# Patient Record
Sex: MALE | Race: WHITE | NOT HISPANIC OR LATINO | Employment: FULL TIME | URBAN - METROPOLITAN AREA
[De-identification: names, ages, dates, MRNs, and addresses within clinical notes are randomized per-mention and may not be internally consistent; named-entity substitution may affect disease eponyms.]

---

## 2017-01-10 ENCOUNTER — PATIENT MESSAGE (OUTPATIENT)
Dept: FAMILY MEDICINE | Facility: CLINIC | Age: 32
End: 2017-01-10

## 2017-03-29 ENCOUNTER — PATIENT MESSAGE (OUTPATIENT)
Dept: FAMILY MEDICINE | Facility: CLINIC | Age: 32
End: 2017-03-29

## 2017-06-28 ENCOUNTER — OFFICE VISIT (OUTPATIENT)
Dept: FAMILY MEDICINE | Facility: CLINIC | Age: 32
End: 2017-06-28
Payer: COMMERCIAL

## 2017-06-28 VITALS
WEIGHT: 195.44 LBS | TEMPERATURE: 98 F | OXYGEN SATURATION: 97 % | SYSTOLIC BLOOD PRESSURE: 110 MMHG | HEART RATE: 65 BPM | DIASTOLIC BLOOD PRESSURE: 80 MMHG | HEIGHT: 67 IN | BODY MASS INDEX: 30.67 KG/M2

## 2017-06-28 DIAGNOSIS — N45.3 EPIDIDYMO-ORCHITIS: Primary | ICD-10-CM

## 2017-06-28 DIAGNOSIS — K62.5 BRBPR (BRIGHT RED BLOOD PER RECTUM): ICD-10-CM

## 2017-06-28 DIAGNOSIS — R07.89 CHEST TIGHTNESS: ICD-10-CM

## 2017-06-28 PROCEDURE — 99999 PR PBB SHADOW E&M-EST. PATIENT-LVL III: CPT | Mod: PBBFAC,,, | Performed by: NURSE PRACTITIONER

## 2017-06-28 PROCEDURE — 99214 OFFICE O/P EST MOD 30 MIN: CPT | Mod: S$GLB,,, | Performed by: NURSE PRACTITIONER

## 2017-06-28 RX ORDER — DOXYCYCLINE 100 MG/1
100 CAPSULE ORAL 2 TIMES DAILY
Qty: 20 CAPSULE | Refills: 0 | Status: SHIPPED | OUTPATIENT
Start: 2017-06-28 | End: 2017-07-08

## 2017-06-28 RX ORDER — VILAZODONE HYDROCHLORIDE 20 MG/1
TABLET ORAL
COMMUNITY
Start: 2017-06-27

## 2017-06-28 RX ORDER — IBUPROFEN 800 MG/1
800 TABLET ORAL 3 TIMES DAILY PRN
Qty: 40 TABLET | Refills: 0 | Status: SHIPPED | OUTPATIENT
Start: 2017-06-28 | End: 2017-07-08

## 2017-06-28 RX ORDER — ALBUTEROL SULFATE 90 UG/1
2 AEROSOL, METERED RESPIRATORY (INHALATION) EVERY 6 HOURS PRN
Qty: 1 EACH | Refills: 11 | Status: SHIPPED | OUTPATIENT
Start: 2017-06-28 | End: 2023-12-28

## 2017-06-28 NOTE — PROGRESS NOTES
Subjective:       Patient ID: Johan Cunha is a 31 y.o. male.    Chief Complaint: Rectal Bleeding (off and on x 2 weeks) and pain in penis (x 1 1/2 weeks)    HPI     Patient presents to clinic to clinic with complaints of rectal bleeding, noted a scant amount on toilet paper after wiping after defecation. Sx seemed to be intermitted. Denies hx of constipation. Has been having occasional episodes of diarrhea - Relates to viibryd. Denies hematochezia, hematemesis.   Patient presents to clinic with complaints of pain to the left aspect of the base of the penis radiating to the left testicle. Questions whether there has been swelling. Pain is always present, worse with prolonged sitting. Noted significant pain after intercourse with wife (monogamous relationship) last week. Denies urinary sx, penile discharge.   Lastly, he has noted some chest tightness in recent weeks. Feels as though he can't take a deep breath. Denies VARGAS. Notes some mid-sternal tenderness upon palpation, otherwise denies CP.     Review of Systems   Constitutional: Negative for chills and fever.   Respiratory: Positive for chest tightness.    Gastrointestinal: Positive for anal bleeding and nausea. Negative for diarrhea and vomiting.   Genitourinary: Positive for penile pain, penile swelling, scrotal swelling and testicular pain. Negative for decreased urine volume, difficulty urinating, discharge, dysuria and frequency.       Objective:      Physical Exam   Constitutional: He is oriented to person, place, and time. He appears well-developed and well-nourished.   HENT:   Head: Normocephalic and atraumatic.   Cardiovascular: Normal rate, regular rhythm and normal heart sounds.    No murmur heard.  Pulmonary/Chest: Effort normal and breath sounds normal. No respiratory distress. He has no wheezes. He has no rales.   Genitourinary: Right testis shows no mass, no swelling and no tenderness. Right testis is descended. Left testis shows tenderness. Left  testis shows no mass and no swelling. Left testis is descended.   Genitourinary Comments: Declined PALLAVI.    Musculoskeletal: Normal range of motion. He exhibits no edema, tenderness or deformity.   Neurological: He is alert and oriented to person, place, and time. No cranial nerve deficit.   Skin: Skin is warm and dry.   Psychiatric: He has a normal mood and affect. His behavior is normal.   Nursing note and vitals reviewed.      Assessment:       1. Epididymo-orchitis    2. Chest tightness    3. BRBPR (bright red blood per rectum)        Plan:   Johan was seen today for rectal bleeding and pain in penis.    Diagnoses and all orders for this visit:    Epididymo-orchitis  -     doxycycline (VIBRAMYCIN) 100 MG Cap; Take 1 capsule (100 mg total) by mouth 2 (two) times daily.  -     US Scrotum And Testicles; Future  -     ibuprofen (ADVIL,MOTRIN) 800 MG tablet; Take 1 tablet (800 mg total) by mouth 3 (three) times daily as needed for Pain.  Side effects and precautions of medication use reviewed with patient, expressed understanding. No questions or concerns.    Chest tightness  -     X-Ray Chest PA And Lateral; Future  -     EKG 12-lead; Future  -     albuterol 90 mcg/actuation inhaler; Inhale 2 puffs into the lungs every 6 (six) hours as needed for Wheezing.  Red flags reviewed. Go to the ER with active complaints.     BRBPR (bright red blood per rectum)  Patient declined in office exam/additional evaluation.   May be related to excoriation secondary to diarrhea.   Sx have resolved.   Discussed risks associated with rectal bleeding, including cancer.   Pt to follow-up if sx return for additional evaluation.

## 2017-07-06 ENCOUNTER — CLINICAL SUPPORT (OUTPATIENT)
Dept: CARDIOLOGY | Facility: CLINIC | Age: 32
End: 2017-07-06
Payer: COMMERCIAL

## 2017-07-06 ENCOUNTER — PATIENT MESSAGE (OUTPATIENT)
Dept: FAMILY MEDICINE | Facility: CLINIC | Age: 32
End: 2017-07-06

## 2017-07-06 ENCOUNTER — HOSPITAL ENCOUNTER (OUTPATIENT)
Dept: RADIOLOGY | Facility: HOSPITAL | Age: 32
Discharge: HOME | End: 2017-07-06
Attending: NURSE PRACTITIONER
Payer: COMMERCIAL

## 2017-07-06 DIAGNOSIS — R07.89 CHEST TIGHTNESS: ICD-10-CM

## 2017-07-06 DIAGNOSIS — N45.3 EPIDIDYMO-ORCHITIS: ICD-10-CM

## 2017-07-06 PROCEDURE — 71020 XR CHEST PA AND LATERAL: CPT | Mod: TC,PO

## 2017-07-06 PROCEDURE — 71020 XR CHEST PA AND LATERAL: CPT | Mod: 26,,, | Performed by: RADIOLOGY

## 2017-07-06 PROCEDURE — 93000 ELECTROCARDIOGRAM COMPLETE: CPT | Mod: S$GLB,,, | Performed by: INTERNAL MEDICINE

## 2017-07-06 PROCEDURE — 76870 US EXAM SCROTUM: CPT | Mod: 26,,, | Performed by: RADIOLOGY

## 2017-07-06 PROCEDURE — 76870 US EXAM SCROTUM: CPT | Mod: TC,PO

## 2019-08-15 LAB — RAPID GROUP A STREP (OHS): POSITIVE

## 2019-09-04 ENCOUNTER — TELEPHONE (OUTPATIENT)
Dept: FAMILY MEDICINE | Facility: CLINIC | Age: 34
End: 2019-09-04

## 2019-11-27 LAB
INFLUENZA A ANTIGEN, POC: NEGATIVE
INFLUENZA B ANTIGEN, POC: POSITIVE
RAPID GROUP A STREP (OHS): NEGATIVE

## 2021-09-01 ENCOUNTER — HISTORICAL (OUTPATIENT)
Dept: ADMINISTRATIVE | Facility: HOSPITAL | Age: 36
End: 2021-09-01

## 2021-09-01 LAB — SARS-COV-2 RNA RESP QL NAA+PROBE: POSITIVE

## 2022-04-10 ENCOUNTER — HISTORICAL (OUTPATIENT)
Dept: ADMINISTRATIVE | Facility: HOSPITAL | Age: 37
End: 2022-04-10
Payer: COMMERCIAL

## 2022-04-27 VITALS
HEIGHT: 67 IN | BODY MASS INDEX: 30.45 KG/M2 | OXYGEN SATURATION: 98 % | DIASTOLIC BLOOD PRESSURE: 89 MMHG | WEIGHT: 194 LBS | SYSTOLIC BLOOD PRESSURE: 134 MMHG

## 2022-09-16 ENCOUNTER — HISTORICAL (OUTPATIENT)
Dept: ADMINISTRATIVE | Facility: HOSPITAL | Age: 37
End: 2022-09-16
Payer: COMMERCIAL

## 2022-09-17 ENCOUNTER — HISTORICAL (OUTPATIENT)
Dept: ADMINISTRATIVE | Facility: HOSPITAL | Age: 37
End: 2022-09-17
Payer: COMMERCIAL

## 2023-03-04 ENCOUNTER — OFFICE VISIT (OUTPATIENT)
Dept: URGENT CARE | Facility: CLINIC | Age: 38
End: 2023-03-04
Payer: COMMERCIAL

## 2023-03-04 VITALS
OXYGEN SATURATION: 98 % | WEIGHT: 204 LBS | DIASTOLIC BLOOD PRESSURE: 85 MMHG | TEMPERATURE: 98 F | SYSTOLIC BLOOD PRESSURE: 128 MMHG | HEART RATE: 96 BPM | HEIGHT: 66 IN | BODY MASS INDEX: 32.78 KG/M2 | RESPIRATION RATE: 16 BRPM

## 2023-03-04 DIAGNOSIS — J00 ACUTE NASOPHARYNGITIS: Primary | ICD-10-CM

## 2023-03-04 DIAGNOSIS — J02.9 SORE THROAT: ICD-10-CM

## 2023-03-04 LAB
CTP QC/QA: YES
MOLECULAR STREP A: NEGATIVE

## 2023-03-04 PROCEDURE — 87651 STREP A DNA AMP PROBE: CPT | Mod: QW,,, | Performed by: PHYSICIAN ASSISTANT

## 2023-03-04 PROCEDURE — 87651 POCT STREP A MOLECULAR: ICD-10-PCS | Mod: QW,,, | Performed by: PHYSICIAN ASSISTANT

## 2023-03-04 PROCEDURE — 3074F PR MOST RECENT SYSTOLIC BLOOD PRESSURE < 130 MM HG: ICD-10-PCS | Mod: CPTII,,, | Performed by: PHYSICIAN ASSISTANT

## 2023-03-04 PROCEDURE — 3079F DIAST BP 80-89 MM HG: CPT | Mod: CPTII,,, | Performed by: PHYSICIAN ASSISTANT

## 2023-03-04 PROCEDURE — 3079F PR MOST RECENT DIASTOLIC BLOOD PRESSURE 80-89 MM HG: ICD-10-PCS | Mod: CPTII,,, | Performed by: PHYSICIAN ASSISTANT

## 2023-03-04 PROCEDURE — 96372 PR INJECTION,THERAP/PROPH/DIAG2ST, IM OR SUBCUT: ICD-10-PCS | Mod: JZ,,, | Performed by: PHYSICIAN ASSISTANT

## 2023-03-04 PROCEDURE — 99213 OFFICE O/P EST LOW 20 MIN: CPT | Mod: 25,,, | Performed by: PHYSICIAN ASSISTANT

## 2023-03-04 PROCEDURE — 3008F BODY MASS INDEX DOCD: CPT | Mod: CPTII,,, | Performed by: PHYSICIAN ASSISTANT

## 2023-03-04 PROCEDURE — 96372 THER/PROPH/DIAG INJ SC/IM: CPT | Mod: JZ,,, | Performed by: PHYSICIAN ASSISTANT

## 2023-03-04 PROCEDURE — 3008F PR BODY MASS INDEX (BMI) DOCUMENTED: ICD-10-PCS | Mod: CPTII,,, | Performed by: PHYSICIAN ASSISTANT

## 2023-03-04 PROCEDURE — 1159F PR MEDICATION LIST DOCUMENTED IN MEDICAL RECORD: ICD-10-PCS | Mod: CPTII,,, | Performed by: PHYSICIAN ASSISTANT

## 2023-03-04 PROCEDURE — 99213 PR OFFICE/OUTPT VISIT, EST, LEVL III, 20-29 MIN: ICD-10-PCS | Mod: 25,,, | Performed by: PHYSICIAN ASSISTANT

## 2023-03-04 PROCEDURE — 1160F RVW MEDS BY RX/DR IN RCRD: CPT | Mod: CPTII,,, | Performed by: PHYSICIAN ASSISTANT

## 2023-03-04 PROCEDURE — 1160F PR REVIEW ALL MEDS BY PRESCRIBER/CLIN PHARMACIST DOCUMENTED: ICD-10-PCS | Mod: CPTII,,, | Performed by: PHYSICIAN ASSISTANT

## 2023-03-04 PROCEDURE — 1159F MED LIST DOCD IN RCRD: CPT | Mod: CPTII,,, | Performed by: PHYSICIAN ASSISTANT

## 2023-03-04 PROCEDURE — 3074F SYST BP LT 130 MM HG: CPT | Mod: CPTII,,, | Performed by: PHYSICIAN ASSISTANT

## 2023-03-04 RX ORDER — ROSUVASTATIN CALCIUM 20 MG/1
1 TABLET, COATED ORAL DAILY
COMMUNITY
Start: 2023-01-16

## 2023-03-04 RX ORDER — DULOXETIN HYDROCHLORIDE 30 MG/1
1 CAPSULE, DELAYED RELEASE ORAL EVERY MORNING
COMMUNITY
Start: 2022-12-09 | End: 2023-12-28

## 2023-03-04 RX ORDER — BETAMETHASONE SODIUM PHOSPHATE AND BETAMETHASONE ACETATE 3; 3 MG/ML; MG/ML
9 INJECTION, SUSPENSION INTRA-ARTICULAR; INTRALESIONAL; INTRAMUSCULAR; SOFT TISSUE
Status: COMPLETED | OUTPATIENT
Start: 2023-03-04 | End: 2023-03-04

## 2023-03-04 RX ADMIN — BETAMETHASONE SODIUM PHOSPHATE AND BETAMETHASONE ACETATE 9 MG: 3; 3 INJECTION, SUSPENSION INTRA-ARTICULAR; INTRALESIONAL; INTRAMUSCULAR; SOFT TISSUE at 02:03

## 2023-03-04 NOTE — PROGRESS NOTES
"Subjective:       Patient ID: Johan Cunha is a 37 y.o. male.    Vitals:  height is 5' 6" (1.676 m) and weight is 92.5 kg (204 lb). His temperature is 98 °F (36.7 °C). His blood pressure is 128/85 and his pulse is 96. His respiration is 16 and oxygen saturation is 98%.     Chief Complaint: Sore Throat (Scratchy throat)    Sore throat, congestion, nasal drip x2 days. Sudafed taken at 8:30am, minor relief.  Denies any fever cough neck stiffness rash or GI symptoms.    Sore Throat     HENT:  Positive for sore throat.      Objective:      Physical Exam   Constitutional: He is oriented to person, place, and time. He appears well-developed. He is cooperative.  Non-toxic appearance. He does not appear ill. No distress.   HENT:   Head: Normocephalic and atraumatic.   Ears:   Right Ear: Hearing, tympanic membrane, external ear and ear canal normal.   Left Ear: Hearing, tympanic membrane, external ear and ear canal normal.   Nose: Nose normal. No nasal deformity. No epistaxis.   Mouth/Throat: Uvula is midline and mucous membranes are normal. No trismus in the jaw. Normal dentition. No uvula swelling. Posterior oropharyngeal erythema present. No oropharyngeal exudate or posterior oropharyngeal edema.   Eyes: Conjunctivae and lids are normal. No scleral icterus.   Neck: Trachea normal and phonation normal. Neck supple. No edema present. No erythema present. No neck rigidity present.   Cardiovascular: Normal rate, regular rhythm, normal heart sounds and normal pulses.   Pulmonary/Chest: Effort normal and breath sounds normal. No respiratory distress. He has no decreased breath sounds. He has no rhonchi.   Abdominal: Normal appearance.   Musculoskeletal: Normal range of motion.         General: No deformity. Normal range of motion.   Lymphadenopathy:     He has no cervical adenopathy.   Neurological: He is alert and oriented to person, place, and time. He exhibits normal muscle tone. Coordination normal.   Skin: Skin is warm, " "dry, intact, not diaphoretic and not pale.   Psychiatric: His speech is normal and behavior is normal. Judgment and thought content normal.   Nursing note and vitals reviewed.             Previous History      Review of patient's allergies indicates:  No Known Allergies    Past Medical History:   Diagnosis Date    Anxiety 6/19/2013    Panic disorder    HLD (hyperlipidemia) 3/2/2015     Current Outpatient Medications   Medication Instructions    albuterol 90 mcg/actuation inhaler 2 puffs, Inhalation, Every 6 hours PRN    DULoxetine (CYMBALTA) 30 MG capsule 1 capsule, Oral, Every morning    rosuvastatin (CRESTOR) 20 MG tablet 1 tablet, Oral, Daily    VIIBRYD 20 mg Tab No dose, route, or frequency recorded.     Past Surgical History:   Procedure Laterality Date    TONSILLECTOMY AND ADENOIDECTOMY       Family History   Problem Relation Age of Onset    Depression Mother     Hyperlipidemia Father     Heart disease Father 55        MI    Depression Father     No Known Problems Sister     No Known Problems Brother     Depression Maternal Grandmother         possible bipolar    Heart disease Paternal Grandmother 54        MI    Heart disease Paternal Grandfather         cabg       Social History     Tobacco Use    Smoking status: Never     Passive exposure: Never    Smokeless tobacco: Never   Substance Use Topics    Alcohol use: Yes     Alcohol/week: 12.0 standard drinks     Types: 12 Cans of beer per week        Physical Exam      Vital Signs Reviewed   /85   Pulse 96   Temp 98 °F (36.7 °C)   Resp 16   Ht 5' 6" (1.676 m)   Wt 92.5 kg (204 lb)   SpO2 98%   BMI 32.93 kg/m²        Procedures    Procedures     Labs     Results for orders placed or performed in visit on 03/04/23   POCT Strep A, Molecular   Result Value Ref Range    Molecular Strep A, POC Negative Negative     Acceptable Yes      Assessment:       1. Acute nasopharyngitis    2. Sore throat          Plan:         Acute " nasopharyngitis    Sore throat  -     POCT Strep A, Molecular    Other orders  -     betamethasone acetate-betamethasone sodium phosphate injection 9 mg    Drink plenty of fluids.     Get plenty of rest.     Tylenol or Motrin as needed.     Go to the ER with any significant change or worsening of symptoms.     Follow up with your primary care doctor.

## 2023-08-19 ENCOUNTER — OFFICE VISIT (OUTPATIENT)
Dept: URGENT CARE | Facility: CLINIC | Age: 38
End: 2023-08-19
Payer: COMMERCIAL

## 2023-08-19 VITALS
HEIGHT: 66 IN | SYSTOLIC BLOOD PRESSURE: 138 MMHG | RESPIRATION RATE: 20 BRPM | DIASTOLIC BLOOD PRESSURE: 83 MMHG | BODY MASS INDEX: 32.49 KG/M2 | WEIGHT: 202.19 LBS | TEMPERATURE: 99 F | OXYGEN SATURATION: 97 % | HEART RATE: 87 BPM

## 2023-08-19 DIAGNOSIS — M25.561 PAIN AND SWELLING OF RIGHT KNEE: Primary | ICD-10-CM

## 2023-08-19 DIAGNOSIS — M25.461 PAIN AND SWELLING OF RIGHT KNEE: Primary | ICD-10-CM

## 2023-08-19 PROCEDURE — 99213 OFFICE O/P EST LOW 20 MIN: CPT | Mod: ,,, | Performed by: PHYSICIAN ASSISTANT

## 2023-08-19 PROCEDURE — 99213 PR OFFICE/OUTPT VISIT, EST, LEVL III, 20-29 MIN: ICD-10-PCS | Mod: ,,, | Performed by: PHYSICIAN ASSISTANT

## 2023-08-19 RX ORDER — NABUMETONE 500 MG/1
500 TABLET, FILM COATED ORAL 2 TIMES DAILY PRN
Qty: 20 TABLET | Refills: 0 | Status: SHIPPED | OUTPATIENT
Start: 2023-08-19 | End: 2023-08-19

## 2023-08-19 RX ORDER — TRAMADOL HYDROCHLORIDE 50 MG/1
50 TABLET ORAL EVERY 6 HOURS PRN
Qty: 12 TABLET | Refills: 0 | Status: SHIPPED | OUTPATIENT
Start: 2023-08-19 | End: 2023-08-22

## 2023-08-19 RX ORDER — PREDNISONE 20 MG/1
20 TABLET ORAL 2 TIMES DAILY
Qty: 10 TABLET | Refills: 0 | Status: SHIPPED | OUTPATIENT
Start: 2023-08-19 | End: 2023-08-19

## 2023-08-19 RX ORDER — NABUMETONE 500 MG/1
500 TABLET, FILM COATED ORAL 2 TIMES DAILY PRN
Qty: 20 TABLET | Refills: 0 | Status: SHIPPED | OUTPATIENT
Start: 2023-08-19 | End: 2023-08-29

## 2023-08-19 RX ORDER — PREDNISONE 20 MG/1
20 TABLET ORAL 2 TIMES DAILY
Qty: 10 TABLET | Refills: 0 | Status: SHIPPED | OUTPATIENT
Start: 2023-08-19 | End: 2023-08-24

## 2023-08-19 RX ORDER — CLOMIPHENE CITRATE 50 MG/1
50 TABLET ORAL DAILY
COMMUNITY

## 2023-08-19 RX ORDER — TRAMADOL HYDROCHLORIDE 50 MG/1
50 TABLET ORAL EVERY 6 HOURS PRN
Qty: 12 TABLET | Refills: 0 | Status: SHIPPED | OUTPATIENT
Start: 2023-08-19 | End: 2023-08-19

## 2023-08-19 NOTE — PROGRESS NOTES
"Subjective:      Patient ID: Johan Cunha is a 37 y.o. male.    Vitals:  height is 5' 6" (1.676 m) and weight is 91.7 kg (202 lb 3.2 oz). His oral temperature is 99.4 °F (37.4 °C). His blood pressure is 138/83 and his pulse is 87. His respiration is 20 and oxygen saturation is 97%.     Chief Complaint: Knee Pain    Patient is a 37-year-old male complains of a few day history of right knee pain and swelling.  He denies any trauma, but he went to the gym and did calf workout which may have aggravated the knee.  Denies any erythema or fever.  Complains of a significant increase in pain with knee range of motion.        Skin:  Negative for erythema.      Objective:     Physical Exam   Constitutional: He is oriented to person, place, and time. He appears well-developed.   HENT:   Head: Normocephalic and atraumatic. Head is without abrasion, without contusion and without laceration.   Ears:   Right Ear: External ear normal.   Left Ear: External ear normal.   Nose: Nose normal.   Mouth/Throat: Oropharynx is clear and moist and mucous membranes are normal.   Eyes: Conjunctivae, EOM and lids are normal.   Neck: Phonation normal. Neck supple.   Pulmonary/Chest: Effort normal. No respiratory distress.   Abdominal: Normal appearance.   Musculoskeletal:         General: Swelling and tenderness present.   Neurological: He is alert and oriented to person, place, and time.   Skin: Skin is warm, dry, intact and no rash. Capillary refill takes less than 2 seconds. No abrasion, No burn, No bruising, No erythema and No ecchymosis   Psychiatric: His speech is normal and behavior is normal. Judgment and thought content normal.   Nursing note and vitals reviewed.  Right lower extremity:  There is swelling and diffuse tenderness throughout the anterior, medial, and lateral aspect of the knee.  Range of motion limited secondary to pain.  No appreciated erythema or warmth.  No appreciated calf swelling or erythema.  I was unable to assess " "for ligamentous laxity secondary to the patient regarding.    Xrays:  No observed acute findings. Awaiting radiology over read.      Previous History      Review of patient's allergies indicates:  No Known Allergies    Past Medical History:   Diagnosis Date    Anxiety 6/19/2013    Panic disorder    HLD (hyperlipidemia) 3/2/2015     Current Outpatient Medications   Medication Instructions    albuterol 90 mcg/actuation inhaler 2 puffs, Inhalation, Every 6 hours PRN    clomiPHENE (CLOMID) 50 mg, Oral, Daily    DULoxetine (CYMBALTA) 30 MG capsule 1 capsule, Oral, Every morning    nabumetone (RELAFEN) 500 mg, Oral, 2 times daily PRN    predniSONE (DELTASONE) 20 mg, Oral, 2 times daily    rosuvastatin (CRESTOR) 20 MG tablet 1 tablet, Oral, Daily    traMADoL (ULTRAM) 50 mg, Oral, Every 6 hours PRN    VIIBRYD 20 mg Tab No dose, route, or frequency recorded.     Past Surgical History:   Procedure Laterality Date    TONSILLECTOMY AND ADENOIDECTOMY       Family History   Problem Relation Age of Onset    Depression Mother     Hyperlipidemia Father     Heart disease Father 55        MI    Depression Father     No Known Problems Sister     No Known Problems Brother     Depression Maternal Grandmother         possible bipolar    Heart disease Paternal Grandmother 54        MI    Heart disease Paternal Grandfather         cabg       Social History     Tobacco Use    Smoking status: Never     Passive exposure: Never    Smokeless tobacco: Never   Substance Use Topics    Alcohol use: Yes     Alcohol/week: 12.0 standard drinks of alcohol     Types: 12 Cans of beer per week        Physical Exam      Vital Signs Reviewed   /83 (BP Location: Left arm, Patient Position: Sitting, BP Method: Medium (Automatic))   Pulse 87   Temp 99.4 °F (37.4 °C) (Oral)   Resp 20   Ht 5' 6" (1.676 m)   Wt 91.7 kg (202 lb 3.2 oz)   SpO2 97%   BMI 32.64 kg/m²        Procedures    Procedures     Labs     Results for orders placed or performed in " visit on 03/04/23   POCT Strep A, Molecular   Result Value Ref Range    Molecular Strep A, POC Negative Negative     Acceptable Yes        Assessment:     1. Pain and swelling of right knee        Plan:       Pain and swelling of right knee  -     XR KNEE 3 VIEW RIGHT; Future; Expected date: 08/19/2023    Other orders  -     nabumetone (RELAFEN) 500 MG tablet; Take 1 tablet (500 mg total) by mouth 2 (two) times daily as needed for Pain.  Dispense: 20 tablet; Refill: 0  -     predniSONE (DELTASONE) 20 MG tablet; Take 1 tablet (20 mg total) by mouth 2 (two) times daily. for 5 days  Dispense: 10 tablet; Refill: 0  -     traMADoL (ULTRAM) 50 mg tablet; Take 1 tablet (50 mg total) by mouth every 6 (six) hours as needed for Pain.  Dispense: 12 tablet; Refill: 0    Take medications only as prescribed as they may cause sedation (safety precautions given).     Ace wrap or knee sleeve for compression    Loosen the splint if needed. Elevate extremity above the level of the heart while resting to avoid excessive swelling.     Get plenty of rest.    Follow-up with Orthopedics if needed.     Go to the Emergency Department with any significant change or worsening symptoms.

## 2023-08-19 NOTE — PATIENT INSTRUCTIONS
Take medications only as prescribed as they may cause sedation (safety precautions given).     Ace wrap or knee sleeve for compression    Loosen the splint if needed. Elevate extremity above the level of the heart while resting to avoid excessive swelling.     Get plenty of rest.    Follow-up with Orthopedics if needed.     Go to the Emergency Department with any significant change or worsening symptoms.

## 2023-08-28 ENCOUNTER — OFFICE VISIT (OUTPATIENT)
Dept: URGENT CARE | Facility: CLINIC | Age: 38
End: 2023-08-28
Payer: COMMERCIAL

## 2023-08-28 VITALS
TEMPERATURE: 99 F | OXYGEN SATURATION: 97 % | WEIGHT: 202 LBS | DIASTOLIC BLOOD PRESSURE: 88 MMHG | SYSTOLIC BLOOD PRESSURE: 136 MMHG | HEART RATE: 83 BPM | BODY MASS INDEX: 32.47 KG/M2 | HEIGHT: 66 IN

## 2023-08-28 DIAGNOSIS — M25.561 PAIN AND SWELLING OF RIGHT KNEE: Primary | ICD-10-CM

## 2023-08-28 DIAGNOSIS — M25.461 PAIN AND SWELLING OF RIGHT KNEE: Primary | ICD-10-CM

## 2023-08-28 PROCEDURE — 99213 PR OFFICE/OUTPT VISIT, EST, LEVL III, 20-29 MIN: ICD-10-PCS | Mod: 25,,, | Performed by: PHYSICIAN ASSISTANT

## 2023-08-28 PROCEDURE — 99213 OFFICE O/P EST LOW 20 MIN: CPT | Mod: 25,,, | Performed by: PHYSICIAN ASSISTANT

## 2023-08-28 PROCEDURE — 96372 THER/PROPH/DIAG INJ SC/IM: CPT | Mod: ,,, | Performed by: PHYSICIAN ASSISTANT

## 2023-08-28 PROCEDURE — 96372 PR INJECTION,THERAP/PROPH/DIAG2ST, IM OR SUBCUT: ICD-10-PCS | Mod: ,,, | Performed by: PHYSICIAN ASSISTANT

## 2023-08-28 RX ORDER — NABUMETONE 500 MG/1
500 TABLET, FILM COATED ORAL 2 TIMES DAILY PRN
Qty: 20 TABLET | Refills: 0 | Status: SHIPPED | OUTPATIENT
Start: 2023-08-28 | End: 2023-09-07

## 2023-08-28 RX ORDER — KETOROLAC TROMETHAMINE 30 MG/ML
60 INJECTION, SOLUTION INTRAMUSCULAR; INTRAVENOUS
Status: COMPLETED | OUTPATIENT
Start: 2023-08-28 | End: 2023-08-28

## 2023-08-28 RX ADMIN — KETOROLAC TROMETHAMINE 60 MG: 30 INJECTION, SOLUTION INTRAMUSCULAR; INTRAVENOUS at 08:08

## 2023-08-28 NOTE — PATIENT INSTRUCTIONS
Do not take nabumetone until this evening.     Knee brace    Loosen the splint if needed. Elevate extremity above the level of the heart while resting to avoid excessive swelling.     Get plenty of rest.    Follow-up with Orthopedics as directed.    Go to the Emergency Department with any significant change or worsening symptoms.

## 2023-08-28 NOTE — PROGRESS NOTES
"Subjective:      Patient ID: Johan Cunha is a 37 y.o. male.    Vitals:  height is 5' 6" (1.676 m) and weight is 91.6 kg (202 lb). His temperature is 98.9 °F (37.2 °C). His blood pressure is 136/88 and his pulse is 83. His oxygen saturation is 97%.     Chief Complaint: Pain     Patient is a 37 y.o. male who presents to urgent care with complaints of right knee pain w/ swelling. Patient was seen on 8/19 for knee pain. x3 weeks.  Some mild improvement with medication but still complains of pain and swelling.  Denies any acute trauma.  Denies any calf pain swelling or erythema.    Pain      Skin:  Negative for erythema.      Objective:     Physical Exam   Constitutional: He is oriented to person, place, and time. He appears well-developed.   HENT:   Head: Normocephalic and atraumatic. Head is without abrasion, without contusion and without laceration.   Ears:   Right Ear: External ear normal.   Left Ear: External ear normal.   Nose: Nose normal.   Mouth/Throat: Oropharynx is clear and moist and mucous membranes are normal.   Eyes: Conjunctivae, EOM and lids are normal.   Neck: Phonation normal. Neck supple.   Pulmonary/Chest: Effort normal. No respiratory distress.   Abdominal: Normal appearance.   Musculoskeletal:         General: Swelling present.   Neurological: He is alert and oriented to person, place, and time.   Skin: Skin is warm, dry, intact and no rash. Capillary refill takes less than 2 seconds. No abrasion, No burn, No bruising, No erythema and No ecchymosis   Psychiatric: His speech is normal and behavior is normal. Judgment and thought content normal.   Nursing note and vitals reviewed.  Right knee:  There is mild-to-moderate generalized swelling to anterior aspect of the knee.  No erythema or warmth.  Range of motion has improved since last visit.  No appreciated medial and lateral ligamentous laxity.  Negative Lachman's.  No appreciated calf swelling erythema or tenderness.         Previous History  " "    Review of patient's allergies indicates:  No Known Allergies    Past Medical History:   Diagnosis Date    Anxiety 6/19/2013    Panic disorder    HLD (hyperlipidemia) 3/2/2015     Current Outpatient Medications   Medication Instructions    albuterol 90 mcg/actuation inhaler 2 puffs, Inhalation, Every 6 hours PRN    clomiPHENE (CLOMID) 50 mg, Oral, Daily    DULoxetine (CYMBALTA) 30 MG capsule 1 capsule, Oral, Every morning    nabumetone (RELAFEN) 500 mg, Oral, 2 times daily PRN    nabumetone (RELAFEN) 500 mg, Oral, 2 times daily PRN    rosuvastatin (CRESTOR) 20 MG tablet 1 tablet, Oral, Daily    VIIBRYD 20 mg Tab No dose, route, or frequency recorded.     Past Surgical History:   Procedure Laterality Date    TONSILLECTOMY AND ADENOIDECTOMY       Family History   Problem Relation Age of Onset    Depression Mother     Hyperlipidemia Father     Heart disease Father 55        MI    Depression Father     No Known Problems Sister     No Known Problems Brother     Depression Maternal Grandmother         possible bipolar    Heart disease Paternal Grandmother 54        MI    Heart disease Paternal Grandfather         cabg       Social History     Tobacco Use    Smoking status: Never     Passive exposure: Never    Smokeless tobacco: Never   Substance Use Topics    Alcohol use: Yes     Alcohol/week: 12.0 standard drinks of alcohol     Types: 12 Cans of beer per week        Physical Exam      Vital Signs Reviewed   /88   Pulse 83   Temp 98.9 °F (37.2 °C)   Ht 5' 6" (1.676 m)   Wt 91.6 kg (202 lb)   SpO2 97%   BMI 32.60 kg/m²        Procedures    Procedures     Labs     Results for orders placed or performed in visit on 03/04/23   POCT Strep A, Molecular   Result Value Ref Range    Molecular Strep A, POC Negative Negative     Acceptable Yes        Assessment:     1. Pain and swelling of right knee        Plan:       Pain and swelling of right knee  -     Ambulatory referral/consult to " Orthopedics    Other orders  -     ketorolac injection 60 mg  -     nabumetone (RELAFEN) 500 MG tablet; Take 1 tablet (500 mg total) by mouth 2 (two) times daily as needed for Pain.  Dispense: 20 tablet; Refill: 0      Do not take nabumetone until this evening.     Knee brace    Loosen the splint if needed. Elevate extremity above the level of the heart while resting to avoid excessive swelling.     Get plenty of rest.    Follow-up with Orthopedics as directed.    Go to the Emergency Department with any significant change or worsening symptoms.

## 2023-09-06 ENCOUNTER — OFFICE VISIT (OUTPATIENT)
Dept: ORTHOPEDICS | Facility: CLINIC | Age: 38
End: 2023-09-06
Payer: COMMERCIAL

## 2023-09-06 VITALS
HEIGHT: 66 IN | BODY MASS INDEX: 32.47 KG/M2 | DIASTOLIC BLOOD PRESSURE: 90 MMHG | HEART RATE: 69 BPM | WEIGHT: 202 LBS | SYSTOLIC BLOOD PRESSURE: 133 MMHG

## 2023-09-06 DIAGNOSIS — M25.561 ACUTE PAIN OF RIGHT KNEE: Primary | ICD-10-CM

## 2023-09-06 PROCEDURE — 3075F SYST BP GE 130 - 139MM HG: CPT | Mod: CPTII,,, | Performed by: REHABILITATION UNIT

## 2023-09-06 PROCEDURE — 3080F PR MOST RECENT DIASTOLIC BLOOD PRESSURE >= 90 MM HG: ICD-10-PCS | Mod: CPTII,,, | Performed by: REHABILITATION UNIT

## 2023-09-06 PROCEDURE — 1159F PR MEDICATION LIST DOCUMENTED IN MEDICAL RECORD: ICD-10-PCS | Mod: CPTII,,, | Performed by: REHABILITATION UNIT

## 2023-09-06 PROCEDURE — 1159F MED LIST DOCD IN RCRD: CPT | Mod: CPTII,,, | Performed by: REHABILITATION UNIT

## 2023-09-06 PROCEDURE — 99203 OFFICE O/P NEW LOW 30 MIN: CPT | Mod: ,,, | Performed by: REHABILITATION UNIT

## 2023-09-06 PROCEDURE — 3075F PR MOST RECENT SYSTOLIC BLOOD PRESS GE 130-139MM HG: ICD-10-PCS | Mod: CPTII,,, | Performed by: REHABILITATION UNIT

## 2023-09-06 PROCEDURE — 3008F BODY MASS INDEX DOCD: CPT | Mod: CPTII,,, | Performed by: REHABILITATION UNIT

## 2023-09-06 PROCEDURE — 3008F PR BODY MASS INDEX (BMI) DOCUMENTED: ICD-10-PCS | Mod: CPTII,,, | Performed by: REHABILITATION UNIT

## 2023-09-06 PROCEDURE — 3080F DIAST BP >= 90 MM HG: CPT | Mod: CPTII,,, | Performed by: REHABILITATION UNIT

## 2023-09-06 PROCEDURE — 99203 PR OFFICE/OUTPT VISIT, NEW, LEVL III, 30-44 MIN: ICD-10-PCS | Mod: ,,, | Performed by: REHABILITATION UNIT

## 2023-09-06 NOTE — PROGRESS NOTES
Subjective:      Patient ID: Johan Cunha is a 37 y.o. male.    Chief Complaint: R knee pain    HPI:   Johan Cunha is a 37 y.o. male who presents today for initial evaluation of his right knee.  He reports around a 1 month history of right knee pain and swelling.  He had some minor pain and swelling that was present for few days and then ultimately workout at the gym.  The following day he had intense pain and a large amount of swelling about his knee.  He is unable to recall a precise inciting or traumatic event.  He went to an urgent care provider.  He has tried oral steroids and tramadol.  He had persistent pain and swelling and ultimately had a Toradol injection.  He reports that his swelling has improved but he still has some tightness and discomfort.  He is a active individual.  Pain is localized maximally to the medial aspect of his knee.  He reports an unstable feeling at times.  He is unable to recall any mechanical symptoms.  No sensory or motor changes distally.  He has tried a compression sleeve and ice which he please her very helpful for his swelling.  He has adjusted his activities but his pain and swelling are interfering with his activities of daily living.    Past Medical History:   Diagnosis Date    Anxiety 6/19/2013    Panic disorder    HLD (hyperlipidemia) 3/2/2015     Past Surgical History:   Procedure Laterality Date    TONSILLECTOMY AND ADENOIDECTOMY       Social History     Socioeconomic History    Marital status:    Tobacco Use    Smoking status: Never     Passive exposure: Never    Smokeless tobacco: Never   Substance and Sexual Activity    Alcohol use: Yes     Alcohol/week: 12.0 standard drinks of alcohol     Types: 12 Cans of beer per week         Current Outpatient Medications:     albuterol 90 mcg/actuation inhaler, Inhale 2 puffs into the lungs every 6 (six) hours as needed for Wheezing., Disp: 1 each, Rfl: 11    clomiPHENE (CLOMID) 50 mg tablet, Take 50 mg by mouth once  "daily., Disp: , Rfl:     DULoxetine (CYMBALTA) 30 MG capsule, Take 1 capsule by mouth every morning., Disp: , Rfl:     nabumetone (RELAFEN) 500 MG tablet, Take 1 tablet (500 mg total) by mouth 2 (two) times daily as needed for Pain., Disp: 20 tablet, Rfl: 0    rosuvastatin (CRESTOR) 20 MG tablet, Take 1 tablet by mouth once daily., Disp: , Rfl:     VIIBRYD 20 mg Tab, , Disp: , Rfl:   Review of patient's allergies indicates:  No Known Allergies    BP (!) 133/90   Pulse 69   Ht 5' 6" (1.676 m)   Wt 91.6 kg (202 lb)   BMI 32.60 kg/m²     Comprehensive review of systems completed and negative except as per HPI.        Objective:   Head: Normocephalic, without obvious abnormality, atraumatic  Eyes: conjunctivae/corneas clear. EOM's intact  Ears: normal external appearance  Nose: Nares normal. Septum midline. Mucosa normal. No drainage  Throat: normal findings: lips normal without lesions  Lungs: unlabored breathing on room air  Chest wall: symmetric chest rise  Heart: regular rate and rhythm  Pulses: 2+ and symmetric  Skin: Skin color, texture, turgor normal. No rashes or lesions  Neurologic: Grossly normal    right KNEE:     Alignment: neutral  Appearance: skin in intact without lesion  Effusion:  Small   Gait:  antalgic  Straight Leg Raise: negative  Log Roll: negative  Hip ROM: full and painless  Ankle ROM: full and painless   Knee ROM:  0 - 120  Tenderness: TTP maximally to the medial joint line  Patellar Mobility: normal  Patellar grind: negative  J Sign: negative  PF Crepitus: negative        Shahriar Test:  Positive to pain medially  Valgus Stress @ 0 deg: stable  Valgus Stress @ 30 deg: stable  Varus Stress @ 0 deg: stable  Varus Stress @ 30 deg: stable  Lachman: stable  Ant Drawer: negative   Post Drawer: negative  Posterior Sag Sign: negative  Neurological deficits: SILT dp/sp/t distributions  Motor: 5/5 EHL/FHL/TA/GS    Warm well perfused lower extremity with capillary refill less than 2 seconds and " sensation intact to light touch in the terminal nerve distributions. Calf soft and easily compressible without clinical sign of DVT. No palpable popliteal lymphadenopathy    Assessment:     Imaging:   XR KNEE 3 VIEW RIGHT  EXAMINATION  XR KNEE 3 VIEW RIGHT    CLINICAL HISTORY  Pain in right knee    TECHNIQUE  A total of 3 image(s) submitted of the right knee.    COMPARISON  None available at the time of initial interpretation.    FINDINGS  No displaced fracture or dislocation is identified. The visualized joint spaces are preserved.  Large joint effusion is present.  No aggressive osseous lesion or periosteal reaction is identified.    The included soft tissues are without acute abnormality.    IMPRESSION  Large joint effusion without convincing osseous abnormality.    Electronically signed by: Grady Perez  Date:    08/19/2023  Time:    09:25    Previously obtained radiographs of the right knee were reviewed showing no acute fractures dislocations.  Large joint effusion.        1. Acute pain of right knee          Plan:       Orders Placed This Encounter    MRI Knee Without Contrast Right     Imaging and exam findings discussed.  He is a young, healthy, and active individual that sustained an acute exacerbation of severe right knee pain.  It was associated with a large effusion.  No bony abnormalities on radiographs.  He has seen some improvement with oral medications but has persistent limitations.  On examination he has tenderness to the medial joint line with concern for medial meniscus injury versus cartilage lesion.  Proceed with MRI.  I will see him back after this is completed to discuss results and treatment plan.  Continue symptomatic treatment with rest, ice, compression, and elevation.  Over-the-counter medicines as needed.  All his questions were answered and he was happy and in agreement.

## 2023-09-21 ENCOUNTER — TELEPHONE (OUTPATIENT)
Dept: ORTHOPEDICS | Facility: CLINIC | Age: 38
End: 2023-09-21
Payer: COMMERCIAL

## 2023-09-21 NOTE — TELEPHONE ENCOUNTER
Patient called and lvm regarding MRI that was done recently.         Called patient back and was able to get him on the schedule for results. Patient asked if he would be able to get a call for his results as it would be easier for him. Advised patient I would send a message to Dr. Scanlon regarding this.         Okay with calling patient with results of MRI?

## 2023-09-25 ENCOUNTER — TELEPHONE (OUTPATIENT)
Dept: ORTHOPEDICS | Facility: CLINIC | Age: 38
End: 2023-09-25
Payer: COMMERCIAL

## 2023-09-25 NOTE — TELEPHONE ENCOUNTER
Pt stated he was seen by Dr. Scanlon on 9/6/23. Per Dr. Yan notes he was to have an MRI done of his Rt knee. Pt said he had the MRI done at [facility] on Friday 9/8/23. He did not have a follow up appointment at that time, and by 9/20/23 when he had not heard anything back from the office, he called. He asked if he could have his results and was told he needed an appt. Pt asked if he could speak to Dr. Scanlon to get the results and was told that a message would be sent to Dr. Scanlon. That message was sent late on 9/21/23. Pt did not call before 9/20/23 as he thought someone would call back from the office to either schedule an appointment or give him the results.    I inquired if he was given a follow up appointment or instructions that someone would be in touch or for him to call back and he said no, nothing. He was in pain over the weekend so he went to urgent care. He has not heard from anyone since last reaching out on 9/20/23 so he called about 30 minutes ago and made an appointment to see Dr. Keene on 10/2/23. I offered to get him in sooner with another provider and he said he was ok for now. He does not want to see Dr. Scanlon again.    I informed Pt that if he needed anything, that he could reach out to me and he stated he would, but otherwise we would see him on 10/2/23 with Dr. Keene.    Pt verbalized understanding and will call with any questions or concerns.

## 2023-09-26 NOTE — TELEPHONE ENCOUNTER
Patient did call again and lvm regarding MRI results states he also had a flare up over the weekend as well.       Spoke with Dr. Scanlon who will be reaching out the patient regarding this issue this afternoon.

## 2023-09-27 ENCOUNTER — OFFICE VISIT (OUTPATIENT)
Dept: ORTHOPEDICS | Facility: CLINIC | Age: 38
End: 2023-09-27
Payer: COMMERCIAL

## 2023-09-27 VITALS
HEART RATE: 61 BPM | BODY MASS INDEX: 31.86 KG/M2 | SYSTOLIC BLOOD PRESSURE: 118 MMHG | DIASTOLIC BLOOD PRESSURE: 84 MMHG | WEIGHT: 203 LBS | HEIGHT: 67 IN

## 2023-09-27 DIAGNOSIS — S83.411D SPRAIN OF MEDIAL COLLATERAL LIGAMENT OF RIGHT KNEE, SUBSEQUENT ENCOUNTER: Primary | ICD-10-CM

## 2023-09-27 PROCEDURE — 1159F PR MEDICATION LIST DOCUMENTED IN MEDICAL RECORD: ICD-10-PCS | Mod: CPTII,,, | Performed by: REHABILITATION UNIT

## 2023-09-27 PROCEDURE — 3008F PR BODY MASS INDEX (BMI) DOCUMENTED: ICD-10-PCS | Mod: CPTII,,, | Performed by: REHABILITATION UNIT

## 2023-09-27 PROCEDURE — 1159F MED LIST DOCD IN RCRD: CPT | Mod: CPTII,,, | Performed by: REHABILITATION UNIT

## 2023-09-27 PROCEDURE — 3008F BODY MASS INDEX DOCD: CPT | Mod: CPTII,,, | Performed by: REHABILITATION UNIT

## 2023-09-27 PROCEDURE — 3079F PR MOST RECENT DIASTOLIC BLOOD PRESSURE 80-89 MM HG: ICD-10-PCS | Mod: CPTII,,, | Performed by: REHABILITATION UNIT

## 2023-09-27 PROCEDURE — 3074F SYST BP LT 130 MM HG: CPT | Mod: CPTII,,, | Performed by: REHABILITATION UNIT

## 2023-09-27 PROCEDURE — 20610 LARGE JOINT ASPIRATION/INJECTION: R KNEE: ICD-10-PCS | Mod: RT,,, | Performed by: REHABILITATION UNIT

## 2023-09-27 PROCEDURE — 20610 DRAIN/INJ JOINT/BURSA W/O US: CPT | Mod: RT,,, | Performed by: REHABILITATION UNIT

## 2023-09-27 PROCEDURE — 99213 OFFICE O/P EST LOW 20 MIN: CPT | Mod: 25,,, | Performed by: REHABILITATION UNIT

## 2023-09-27 PROCEDURE — 3079F DIAST BP 80-89 MM HG: CPT | Mod: CPTII,,, | Performed by: REHABILITATION UNIT

## 2023-09-27 PROCEDURE — 99213 PR OFFICE/OUTPT VISIT, EST, LEVL III, 20-29 MIN: ICD-10-PCS | Mod: 25,,, | Performed by: REHABILITATION UNIT

## 2023-09-27 PROCEDURE — 3074F PR MOST RECENT SYSTOLIC BLOOD PRESSURE < 130 MM HG: ICD-10-PCS | Mod: CPTII,,, | Performed by: REHABILITATION UNIT

## 2023-09-27 RX ORDER — BETAMETHASONE SODIUM PHOSPHATE AND BETAMETHASONE ACETATE 3; 3 MG/ML; MG/ML
12 INJECTION, SUSPENSION INTRA-ARTICULAR; INTRALESIONAL; INTRAMUSCULAR; SOFT TISSUE
Status: DISCONTINUED | OUTPATIENT
Start: 2023-09-27 | End: 2023-09-27 | Stop reason: HOSPADM

## 2023-09-27 RX ORDER — LIDOCAINE HYDROCHLORIDE 20 MG/ML
40 INJECTION, SOLUTION INFILTRATION; PERINEURAL
Status: DISCONTINUED | OUTPATIENT
Start: 2023-09-27 | End: 2023-09-27 | Stop reason: HOSPADM

## 2023-09-27 RX ADMIN — LIDOCAINE HYDROCHLORIDE 40 MG: 20 INJECTION, SOLUTION INFILTRATION; PERINEURAL at 08:09

## 2023-09-27 RX ADMIN — BETAMETHASONE SODIUM PHOSPHATE AND BETAMETHASONE ACETATE 12 MG: 3; 3 INJECTION, SUSPENSION INTRA-ARTICULAR; INTRALESIONAL; INTRAMUSCULAR; SOFT TISSUE at 08:09

## 2023-09-27 NOTE — PROGRESS NOTES
Subjective:      Patient ID: Johan Cunha is a 37 y.o. male.    Chief Complaint:   Chief Complaint   Patient presents with    Knee Pain     Right knee cortisone inj     HPI:   Johan Cunha is a 37 y.o. male who presents today for follow up evaluation of his right knee.  Patient reports continued knee pain.  His knee was doing fairly well until this past weekend when he had an acute flare-up.  Reports that his knee swell up and he had exquisite pain with limited motion.  He went to an urgent care clinic.  His pain has resolved as has his swelling over the past few days.  He attributes this maybe to being a little bit more active on Friday.  No sensory or motor changes.  No mechanical symptoms.  No instability.  He is had an MRI completed and is here to discuss results.  No f/c/ns.     Initial HPI:  He reports around a 1 month history of right knee pain and swelling.  He had some minor pain and swelling that was present for few days and then ultimately workout at the gym.  The following day he had intense pain and a large amount of swelling about his knee.  He is unable to recall a precise inciting or traumatic event.  He went to an urgent care provider.  He has tried oral steroids and tramadol.  He had persistent pain and swelling and ultimately had a Toradol injection.  He reports that his swelling has improved but he still has some tightness and discomfort.  He is a active individual.  Pain is localized maximally to the medial aspect of his knee.  He reports an unstable feeling at times.  He is unable to recall any mechanical symptoms.  No sensory or motor changes distally.  He has tried a compression sleeve and ice which he please her very helpful for his swelling.  He has adjusted his activities but his pain and swelling are interfering with his activities of daily living.    Past Medical History:   Diagnosis Date    Anxiety 6/19/2013    Panic disorder    HLD (hyperlipidemia) 3/2/2015     Past Surgical  "History:   Procedure Laterality Date    TONSILLECTOMY AND ADENOIDECTOMY       Social History     Socioeconomic History    Marital status:    Tobacco Use    Smoking status: Never     Passive exposure: Never    Smokeless tobacco: Never   Substance and Sexual Activity    Alcohol use: Yes     Alcohol/week: 12.0 standard drinks of alcohol     Types: 12 Cans of beer per week         Current Outpatient Medications:     albuterol 90 mcg/actuation inhaler, Inhale 2 puffs into the lungs every 6 (six) hours as needed for Wheezing., Disp: 1 each, Rfl: 11    clomiPHENE (CLOMID) 50 mg tablet, Take 50 mg by mouth once daily., Disp: , Rfl:     DULoxetine (CYMBALTA) 30 MG capsule, Take 1 capsule by mouth every morning., Disp: , Rfl:     rosuvastatin (CRESTOR) 20 MG tablet, Take 1 tablet by mouth once daily., Disp: , Rfl:     VIIBRYD 20 mg Tab, , Disp: , Rfl:   Review of patient's allergies indicates:  No Known Allergies    /84 (BP Location: Left arm, Patient Position: Sitting, BP Method: Medium (Automatic))   Pulse 61   Ht 5' 7" (1.702 m)   Wt 92.1 kg (203 lb)   BMI 31.79 kg/m²     Comprehensive review of systems completed and negative except as per HPI.        Objective:   Head: Normocephalic, without obvious abnormality, atraumatic  Eyes: conjunctivae/corneas clear. EOM's intact  Ears: normal external appearance  Nose: Nares normal. Septum midline. Mucosa normal. No drainage  Throat: normal findings: lips normal without lesions  Lungs: unlabored breathing on room air  Chest wall: symmetric chest rise  Heart: regular rate and rhythm  Pulses: 2+ and symmetric  Skin: Skin color, texture, turgor normal. No rashes or lesions  Neurologic: Grossly normal    right KNEE:     Alignment: neutral  Appearance: skin in intact without lesion  Effusion:  Small   Gait:  antalgic  Straight Leg Raise: negative  Log Roll: negative  Hip ROM: full and painless  Ankle ROM: full and painless   Knee ROM:  0 - 125  Tenderness: TTP " maximally to the proximal MCL  Patellar Mobility: normal  Patellar grind: negative  J Sign: negative  PF Crepitus: negative        Shahriar Test:  Positive to pain medially  Valgus Stress @ 0 deg: stable  Valgus Stress @ 30 deg: stable  Varus Stress @ 0 deg: stable  Varus Stress @ 30 deg: stable  Lachman: stable  Ant Drawer: negative   Post Drawer: negative  Posterior Sag Sign: negative  Neurological deficits: SILT dp/sp/t distributions  Motor: 5/5 EHL/FHL/TA/GS    Warm well perfused lower extremity with capillary refill less than 2 seconds and sensation intact to light touch in the terminal nerve distributions. Calf soft and easily compressible without clinical sign of DVT. No palpable popliteal lymphadenopathy    Assessment:     Imaging:   MRI Knee Without Contrast Right  Narrative: EXAMINATION:  MRI KNEE WITHOUT CONTRAST RIGHT    CLINICAL HISTORY:  Knee pain, chronic, negative xray (Age >= 5y);Pain in right knee    TECHNIQUE:  Multiplanar, multisequence images were performed about the right knee.  No contrast was administered.    COMPARISON:  Plain radiographs 08/19/2023    FINDINGS:  There is no fracture, dislocation or destructive osseous lesion.  T1 marrow signal is normal.  There is no aggressive periosteal reaction.  The articular cartilage overlying the patellofemoral joint is well preserved.  The articular cartilage overlying the medial and lateral tibiofemoral joints is preserved without fibrillation or flap.  No osteochondral edema.    The anterior and posterior cruciate ligaments are intact.  There is edema both superficial and deep to the medial collateral ligament.  The lateral collateral ligament complex is intact.  The medial meniscus and lateral meniscus are intact.  The posterior meniscal roots are intact.  There is edema and fluid in the popliteal muscle compatible with grade 2 strain.  The popliteal tendon is intact.  The extensor mechanism is well maintained.  There is a small joint effusion.   There is a very small popliteal cyst.  Impression: Grade 1 MCL sprain.    Grade 2 strain of the popliteal muscle.    Small joint effusion.    Small popliteal cyst.    Electronically signed by: Bassam Forbes  Date:    09/08/2023  Time:    14:37    MRI reviewed showing low-grade MCL sprain and strain of the popliteus muscle.  Small effusion.  Small Baker's cyst.    Previously obtained radiographs of the right knee were reviewed showing no acute fractures dislocations.  Large joint effusion.    Large Joint Aspiration/Injection: R knee    Date/Time: 9/27/2023 8:15 AM    Performed by: Idris Scanlon MD  Authorized by: Idris Scanlon MD    Consent Done?:  Yes (Verbal)  Indications:  Arthritis and pain  Site marked: the procedure site was marked    Timeout: prior to procedure the correct patient, procedure, and site was verified    Prep: patient was prepped and draped in usual sterile fashion    Local anesthesia used?: No      Details:  Needle Size:  22 G  Ultrasonic Guidance for needle placement?: No    Approach:  Lateral  Location:  Knee  Site:  R knee  Medications:  12 mg betamethasone acetate-betamethasone sodium phosphate 6 mg/mL; 40 mg LIDOcaine HCL 20 mg/ml (2%) 20 mg/mL (2 %)  Patient tolerance:  Patient tolerated the procedure well with no immediate complications        1. Sprain of medial collateral ligament of right knee, subsequent encounter            Plan:       Orders Placed This Encounter    Large Joint Aspiration/Injection    Ambulatory referral/consult to Physical/Occupational Therapy       Imaging and exam findings discussed.  MRI shows low-grade MCL sprain and strain of the popliteus muscle.  He has continued pain and has had associated swelling.  We discussed his options.  He elected to proceed with injection which was provided as above and he tolerated it well.  Post-injection care was discussed.  Resume activities as able.  Continue symptomatic treatment with rest, ice, compression, and  elevation.  Oral anti-inflammatories.  We will also start some physical therapy at [facility] for range of motion and strengthening as well as stretching of his hamstrings.  Follow up with me as needed. All his questions were answered and he was happy and in agreement.

## 2023-10-05 ENCOUNTER — TELEPHONE (OUTPATIENT)
Dept: ORTHOPEDICS | Facility: CLINIC | Age: 38
End: 2023-10-05
Payer: COMMERCIAL

## 2023-10-05 DIAGNOSIS — M25.561 ACUTE PAIN OF RIGHT KNEE: Primary | ICD-10-CM

## 2023-10-05 DIAGNOSIS — S83.411D SPRAIN OF MEDIAL COLLATERAL LIGAMENT OF RIGHT KNEE, SUBSEQUENT ENCOUNTER: ICD-10-CM

## 2023-10-05 RX ORDER — MELOXICAM 15 MG/1
15 TABLET ORAL DAILY
Qty: 20 TABLET | Refills: 2 | Status: SHIPPED | OUTPATIENT
Start: 2023-10-05 | End: 2023-12-06 | Stop reason: SDUPTHER

## 2023-10-05 NOTE — TELEPHONE ENCOUNTER
Patient called and lvm asking if something can be sent in to pharmacy for pain.       Called patient and advised can send in some oral anti-inflammatories to pharmacy to help for pain. States having some ankle pain. States been wrapping ankle while working which has helped with pain relief. Pt states has been icing as well. States starting to have relief from ankle. Patient was okay with anti-inflammatories being sent in. Pt verbalized understanding and will call with any questions or concerns.

## 2023-11-21 ENCOUNTER — TELEPHONE (OUTPATIENT)
Dept: ORTHOPEDICS | Facility: CLINIC | Age: 38
End: 2023-11-21
Payer: COMMERCIAL

## 2023-11-21 NOTE — TELEPHONE ENCOUNTER
Patient left a VM stating that he had pain at the bottom of his foot now and wanted some pain medication I called to let the patient know to take some over the counter medications and that you could always double up on ibuprofen if needed but if that still doesn't seem to be helping to call us back on Monday

## 2023-12-06 ENCOUNTER — OFFICE VISIT (OUTPATIENT)
Dept: ORTHOPEDICS | Facility: CLINIC | Age: 38
End: 2023-12-06
Payer: COMMERCIAL

## 2023-12-06 VITALS
BODY MASS INDEX: 31.86 KG/M2 | SYSTOLIC BLOOD PRESSURE: 130 MMHG | HEART RATE: 62 BPM | HEIGHT: 67 IN | DIASTOLIC BLOOD PRESSURE: 83 MMHG | WEIGHT: 203 LBS

## 2023-12-06 DIAGNOSIS — S83.411D SPRAIN OF MEDIAL COLLATERAL LIGAMENT OF RIGHT KNEE, SUBSEQUENT ENCOUNTER: ICD-10-CM

## 2023-12-06 DIAGNOSIS — M72.2 PLANTAR FASCIITIS OF RIGHT FOOT: Primary | ICD-10-CM

## 2023-12-06 PROCEDURE — 1159F MED LIST DOCD IN RCRD: CPT | Mod: CPTII,,, | Performed by: REHABILITATION UNIT

## 2023-12-06 PROCEDURE — 3075F PR MOST RECENT SYSTOLIC BLOOD PRESS GE 130-139MM HG: ICD-10-PCS | Mod: CPTII,,, | Performed by: REHABILITATION UNIT

## 2023-12-06 PROCEDURE — 3075F SYST BP GE 130 - 139MM HG: CPT | Mod: CPTII,,, | Performed by: REHABILITATION UNIT

## 2023-12-06 PROCEDURE — 99214 PR OFFICE/OUTPT VISIT, EST, LEVL IV, 30-39 MIN: ICD-10-PCS | Mod: ,,, | Performed by: REHABILITATION UNIT

## 2023-12-06 PROCEDURE — 1159F PR MEDICATION LIST DOCUMENTED IN MEDICAL RECORD: ICD-10-PCS | Mod: CPTII,,, | Performed by: REHABILITATION UNIT

## 2023-12-06 PROCEDURE — 3008F PR BODY MASS INDEX (BMI) DOCUMENTED: ICD-10-PCS | Mod: CPTII,,, | Performed by: REHABILITATION UNIT

## 2023-12-06 PROCEDURE — 3079F PR MOST RECENT DIASTOLIC BLOOD PRESSURE 80-89 MM HG: ICD-10-PCS | Mod: CPTII,,, | Performed by: REHABILITATION UNIT

## 2023-12-06 PROCEDURE — 99214 OFFICE O/P EST MOD 30 MIN: CPT | Mod: ,,, | Performed by: REHABILITATION UNIT

## 2023-12-06 PROCEDURE — 3008F BODY MASS INDEX DOCD: CPT | Mod: CPTII,,, | Performed by: REHABILITATION UNIT

## 2023-12-06 PROCEDURE — 3079F DIAST BP 80-89 MM HG: CPT | Mod: CPTII,,, | Performed by: REHABILITATION UNIT

## 2023-12-06 RX ORDER — MELOXICAM 15 MG/1
15 TABLET ORAL DAILY
Qty: 30 TABLET | Refills: 2 | Status: SHIPPED | OUTPATIENT
Start: 2023-12-06

## 2023-12-06 NOTE — PROGRESS NOTES
Subjective:      Patient ID: Johan Cunha is a 38 y.o. male.    Chief Complaint:   Chief Complaint   Patient presents with    Right Knee - Follow-up     F/U for right knee pain. Knee is doing good. Still has some pain that's off and on; went to walk-in on 12/1/23 and had relief of pain with mobic and injection. Mobility is good. No new concerns with it.     HPI:   Johan Cunha is a 38 y.o. male who presents today for follow up evaluation of his right knee.  He was last seen a little over 2 months ago and was provided with an injection.  He reports that his knee is doing very well.  He went to a few therapy sessions and has been working on exercises on his own.  He did have a flare-up of foot pain here recently.  Pain is localized to the posterior heel and plantar surface.  He was able to take some Mobic after an urgent care visit and reports that this was very helpful.  He has been working on stretching.  No sensory or motor changes distally.  Again he has no mechanical symptoms or instability to his knee.    Initial HPI:  He reports around a 1 month history of right knee pain and swelling.  He had some minor pain and swelling that was present for few days and then ultimately workout at the gym.  The following day he had intense pain and a large amount of swelling about his knee.  He is unable to recall a precise inciting or traumatic event.  He went to an urgent care provider.  He has tried oral steroids and tramadol.  He had persistent pain and swelling and ultimately had a Toradol injection.  He reports that his swelling has improved but he still has some tightness and discomfort.  He is a active individual.  Pain is localized maximally to the medial aspect of his knee.  He reports an unstable feeling at times.  He is unable to recall any mechanical symptoms.  No sensory or motor changes distally.  He has tried a compression sleeve and ice which he please her very helpful for his swelling.  He has adjusted  "his activities but his pain and swelling are interfering with his activities of daily living.    Past Medical History:   Diagnosis Date    Anxiety 6/19/2013    Panic disorder    HLD (hyperlipidemia) 3/2/2015     Past Surgical History:   Procedure Laterality Date    TONSILLECTOMY AND ADENOIDECTOMY       Social History     Socioeconomic History    Marital status:    Tobacco Use    Smoking status: Never     Passive exposure: Never    Smokeless tobacco: Never   Substance and Sexual Activity    Alcohol use: Yes     Alcohol/week: 12.0 standard drinks of alcohol     Types: 12 Cans of beer per week         Current Outpatient Medications:     albuterol 90 mcg/actuation inhaler, Inhale 2 puffs into the lungs every 6 (six) hours as needed for Wheezing., Disp: 1 each, Rfl: 11    clomiPHENE (CLOMID) 50 mg tablet, Take 50 mg by mouth once daily., Disp: , Rfl:     DULoxetine (CYMBALTA) 30 MG capsule, Take 1 capsule by mouth every morning., Disp: , Rfl:     meloxicam (MOBIC) 15 MG tablet, Take 1 tablet (15 mg total) by mouth once daily., Disp: 30 tablet, Rfl: 2    rosuvastatin (CRESTOR) 20 MG tablet, Take 1 tablet by mouth once daily., Disp: , Rfl:     VIIBRYD 20 mg Tab, , Disp: , Rfl:   Review of patient's allergies indicates:  No Known Allergies    /83   Pulse 62   Ht 5' 7" (1.702 m)   Wt 92.1 kg (203 lb)   BMI 31.79 kg/m²     Comprehensive review of systems completed and negative except as per HPI.        Objective:   Head: Normocephalic, without obvious abnormality, atraumatic  Eyes: conjunctivae/corneas clear. EOM's intact  Ears: normal external appearance  Nose: Nares normal. Septum midline. Mucosa normal. No drainage  Throat: normal findings: lips normal without lesions  Lungs: unlabored breathing on room air  Chest wall: symmetric chest rise  Heart: regular rate and rhythm  Pulses: 2+ and symmetric  Skin: Skin color, texture, turgor normal. No rashes or lesions  Neurologic: Grossly normal    right KNEE: "     Alignment: neutral  Appearance: skin in intact without lesion  Effusion:  None   Gait:  wnl  Straight Leg Raise: negative  Log Roll: negative  Hip ROM: full and painless  Ankle ROM: full and painless   Knee ROM:  0 - 135  Tenderness:  No tenderness to the joint lines or knee; some tenderness along the plantar fascia  Patellar Mobility: normal  Patellar grind: negative  J Sign: negative  PF Crepitus: negative        Shahriar Test:  -  Valgus Stress @ 0 deg: stable  Valgus Stress @ 30 deg: stable  Varus Stress @ 0 deg: stable  Varus Stress @ 30 deg: stable  Lachman: stable  Ant Drawer: negative   Post Drawer: negative  Posterior Sag Sign: negative  Neurological deficits: SILT dp/sp/t distributions  Motor: 5/5 EHL/FHL/TA/GS    Positive Silfverskiold    Warm well perfused lower extremity with capillary refill less than 2 seconds and sensation intact to light touch in the terminal nerve distributions. Calf soft and easily compressible without clinical sign of DVT. No palpable popliteal lymphadenopathy    Assessment:     Imaging:   MRI Knee Without Contrast Right  Narrative: EXAMINATION:  MRI KNEE WITHOUT CONTRAST RIGHT    CLINICAL HISTORY:  Knee pain, chronic, negative xray (Age >= 5y);Pain in right knee    TECHNIQUE:  Multiplanar, multisequence images were performed about the right knee.  No contrast was administered.    COMPARISON:  Plain radiographs 08/19/2023    FINDINGS:  There is no fracture, dislocation or destructive osseous lesion.  T1 marrow signal is normal.  There is no aggressive periosteal reaction.  The articular cartilage overlying the patellofemoral joint is well preserved.  The articular cartilage overlying the medial and lateral tibiofemoral joints is preserved without fibrillation or flap.  No osteochondral edema.    The anterior and posterior cruciate ligaments are intact.  There is edema both superficial and deep to the medial collateral ligament.  The lateral collateral ligament complex is  intact.  The medial meniscus and lateral meniscus are intact.  The posterior meniscal roots are intact.  There is edema and fluid in the popliteal muscle compatible with grade 2 strain.  The popliteal tendon is intact.  The extensor mechanism is well maintained.  There is a small joint effusion.  There is a very small popliteal cyst.  Impression: Grade 1 MCL sprain.    Grade 2 strain of the popliteal muscle.    Small joint effusion.    Small popliteal cyst.    Electronically signed by: Bassam Forbes  Date:    09/08/2023  Time:    14:37    MRI reviewed showing low-grade MCL sprain and strain of the popliteus muscle.  Small effusion.  Small Baker's cyst.    Previously obtained radiographs of the right knee were reviewed showing no acute fractures dislocations.  Large joint effusion.        1. Plantar fasciitis of right foot    2. Sprain of medial collateral ligament of right knee, subsequent encounter          Plan:       Orders Placed This Encounter    meloxicam (MOBIC) 15 MG tablet     Imaging and exam findings discussed.  He is doing very well with recovery to his right knee.  He has completed therapy and has no issues.  He will continue his home exercises.  Did have a flare-up of right foot pain which is consistent with plantar fasciitis.  On exam he has a very tight heel cord.  In office stretches were demonstrated.  He is also responded well to Mobic.  Refills were provided today.  He will continue his stretching and exercises and follow up with me as needed.  All his questions were answered and he was in agreement.

## 2023-12-28 ENCOUNTER — OFFICE VISIT (OUTPATIENT)
Dept: URGENT CARE | Facility: CLINIC | Age: 38
End: 2023-12-28
Payer: COMMERCIAL

## 2023-12-28 VITALS
OXYGEN SATURATION: 99 % | BODY MASS INDEX: 31.71 KG/M2 | DIASTOLIC BLOOD PRESSURE: 85 MMHG | HEART RATE: 90 BPM | RESPIRATION RATE: 18 BRPM | HEIGHT: 67 IN | TEMPERATURE: 97 F | WEIGHT: 202 LBS | SYSTOLIC BLOOD PRESSURE: 127 MMHG

## 2023-12-28 DIAGNOSIS — R09.81 NASAL CONGESTION: Primary | ICD-10-CM

## 2023-12-28 DIAGNOSIS — R09.82 POSTNASAL DRIP: ICD-10-CM

## 2023-12-28 LAB
CTP QC/QA: YES
CTP QC/QA: YES
MOLECULAR STREP A: NEGATIVE
POC MOLECULAR INFLUENZA A AGN: NEGATIVE
POC MOLECULAR INFLUENZA B AGN: NEGATIVE

## 2023-12-28 PROCEDURE — 87651 STREP A DNA AMP PROBE: CPT | Mod: QW,,,

## 2023-12-28 PROCEDURE — 87502 INFLUENZA DNA AMP PROBE: CPT | Mod: QW,,,

## 2023-12-28 PROCEDURE — 87651 POCT STREP A MOLECULAR: ICD-10-PCS | Mod: QW,,,

## 2023-12-28 PROCEDURE — 87502 POCT INFLUENZA A/B MOLECULAR: ICD-10-PCS | Mod: QW,,,

## 2023-12-28 PROCEDURE — 99213 OFFICE O/P EST LOW 20 MIN: CPT | Mod: ,,,

## 2023-12-28 PROCEDURE — 99213 PR OFFICE/OUTPT VISIT, EST, LEVL III, 20-29 MIN: ICD-10-PCS | Mod: ,,,

## 2023-12-28 RX ORDER — DEXAMETHASONE SODIUM PHOSPHATE 100 MG/10ML
10 INJECTION INTRAMUSCULAR; INTRAVENOUS
Status: DISCONTINUED | OUTPATIENT
Start: 2023-12-28 | End: 2023-12-28

## 2023-12-28 NOTE — PATIENT INSTRUCTIONS
Negative flu, strep     Drink plenty of fluids. Get plenty of rest.   Claritin or Zyrtec 10 mg for nasal congestion.  Nasal spray such as Nasacort or Flonase for congestion.  Over-the-counter decongestants such as Sudafed, phenylephrine or pseudoephedrine for nasal congestion, sinus pressure  Warm saltwater gargles for sore throat.   Tylenol or ibuprofen as needed for sore throat and fever.   Chloraseptic Spray for worsening sore throat  Call or return to clinic as needed   Go to the ER with any significant change or worsening of symptoms.   Follow up with your primary care doctor.

## 2023-12-28 NOTE — PROGRESS NOTES
"Subjective:      Patient ID: Johan Cunha is a 38 y.o. male.    Vitals:  height is 5' 7" (1.702 m) and weight is 91.6 kg (202 lb). His temperature is 97.1 °F (36.2 °C). His blood pressure is 127/85 and his pulse is 90. His respiration is 18 and oxygen saturation is 99%.     Chief Complaint: Sore Throat     Patient is a 38 y.o. male who presents to urgent care with complaints of nasal congestion, sinus pressure, postnasal drip, ear pressure X 3-4 days.  Patient's family has strep throat.  He does complain of very minimal cough.  Patient denies sore throat, body aches chills, fever, neck stiffness, rash, GI symptoms, shortness breath, difficulty swallowing.  Patient reports he has not taken anything over-the-counter for symptom relief.    ROS   Objective:     Physical Exam   Constitutional: He is oriented to person, place, and time. He appears well-developed. He is cooperative.  Non-toxic appearance. He does not appear ill. No distress.   HENT:   Head: Normocephalic and atraumatic.   Ears:   Right Ear: Hearing, tympanic membrane, external ear and ear canal normal.   Left Ear: Hearing, tympanic membrane, external ear and ear canal normal.   Nose: Congestion present. No mucosal edema, rhinorrhea or nasal deformity. No epistaxis. Right sinus exhibits no maxillary sinus tenderness and no frontal sinus tenderness. Left sinus exhibits no maxillary sinus tenderness and no frontal sinus tenderness.   Mouth/Throat: Uvula is midline, oropharynx is clear and moist and mucous membranes are normal. Mucous membranes are moist. No trismus in the jaw. Normal dentition. No uvula swelling. No oropharyngeal exudate, posterior oropharyngeal edema or posterior oropharyngeal erythema.      Comments: Mild O/P erythema  Eyes: Conjunctivae and lids are normal. No scleral icterus.   Neck: Trachea normal and phonation normal. Neck supple. No edema present. No erythema present. No neck rigidity present.   Cardiovascular: Normal rate, regular " rhythm, normal heart sounds and normal pulses.   Pulmonary/Chest: Effort normal and breath sounds normal. No respiratory distress. He has no decreased breath sounds. He has no rhonchi.   Abdominal: Normal appearance.   Musculoskeletal: Normal range of motion.         General: No deformity. Normal range of motion.   Lymphadenopathy:     He has no cervical adenopathy.   Neurological: He is alert and oriented to person, place, and time. He exhibits normal muscle tone. Coordination normal.   Skin: Skin is warm, dry, intact, not diaphoretic and not pale.   Psychiatric: His speech is normal and behavior is normal. Judgment and thought content normal.   Nursing note and vitals reviewed.      Assessment:     1. Nasal congestion    2. Postnasal drip        Plan:       Nasal congestion  -     POCT Influenza A/B Molecular  -     POCT Strep A, Molecular  -     Discontinue: dexAMETHasone injection 10 mg    Postnasal drip    Patient offered steroid injection, declines in clinic at this time.  Declines need for prescription cough medication.    Negative flu, strep      Drink plenty of fluids. Get plenty of rest.   Claritin or Zyrtec 10 mg for nasal congestion.  Nasal spray such as Nasacort or Flonase for congestion.  Over-the-counter decongestants such as Sudafed, phenylephrine or pseudoephedrine for nasal congestion, sinus pressure  Warm saltwater gargles for sore throat.   Tylenol or ibuprofen as needed for sore throat and fever.   Chloraseptic Spray for worsening sore throat  Call or return to clinic as needed   Go to the ER with any significant change or worsening of symptoms.   Follow up with your primary care doctor.

## 2024-12-19 ENCOUNTER — OFFICE VISIT (OUTPATIENT)
Dept: URGENT CARE | Facility: CLINIC | Age: 39
End: 2024-12-19
Payer: COMMERCIAL

## 2024-12-19 VITALS
WEIGHT: 202 LBS | DIASTOLIC BLOOD PRESSURE: 83 MMHG | OXYGEN SATURATION: 98 % | TEMPERATURE: 99 F | SYSTOLIC BLOOD PRESSURE: 138 MMHG | BODY MASS INDEX: 31.71 KG/M2 | HEART RATE: 77 BPM | RESPIRATION RATE: 18 BRPM | HEIGHT: 67 IN

## 2024-12-19 DIAGNOSIS — S29.019A THORACIC MYOFASCIAL STRAIN, INITIAL ENCOUNTER: Primary | ICD-10-CM

## 2024-12-19 RX ORDER — CYCLOBENZAPRINE HCL 10 MG
10 TABLET ORAL 3 TIMES DAILY PRN
Qty: 21 TABLET | Refills: 0 | Status: SHIPPED | OUTPATIENT
Start: 2024-12-19 | End: 2024-12-26

## 2024-12-19 RX ORDER — KETOROLAC TROMETHAMINE 30 MG/ML
30 INJECTION, SOLUTION INTRAMUSCULAR; INTRAVENOUS
Status: COMPLETED | OUTPATIENT
Start: 2024-12-19 | End: 2024-12-19

## 2024-12-19 RX ORDER — DEXAMETHASONE SODIUM PHOSPHATE 10 MG/ML
10 INJECTION INTRAMUSCULAR; INTRAVENOUS
Status: COMPLETED | OUTPATIENT
Start: 2024-12-19 | End: 2024-12-19

## 2024-12-19 RX ORDER — PREDNISONE 20 MG/1
40 TABLET ORAL DAILY
Qty: 10 TABLET | Refills: 0 | Status: SHIPPED | OUTPATIENT
Start: 2024-12-19 | End: 2024-12-24

## 2024-12-19 RX ADMIN — KETOROLAC TROMETHAMINE 30 MG: 30 INJECTION, SOLUTION INTRAMUSCULAR; INTRAVENOUS at 09:12

## 2024-12-19 RX ADMIN — DEXAMETHASONE SODIUM PHOSPHATE 10 MG: 10 INJECTION INTRAMUSCULAR; INTRAVENOUS at 09:12

## 2024-12-19 NOTE — PROGRESS NOTES
"Subjective:      Patient ID: Johan Cunha is a 39 y.o. male.    Vitals:  height is 5' 7" (1.702 m) and weight is 91.6 kg (202 lb). His temperature is 98.6 °F (37 °C). His blood pressure is 138/83 and his pulse is 77. His respiration is 18 and oxygen saturation is 98%.     Chief Complaint: Back Pain     Patient is a 39 y.o. male who presents to urgent care with complaints of back pain x1 week. Alleviating factors include icy hot spray, patches, meloxicam with no relief.  States he was working out at the gym when he started feeling the back pain.  Denies any radiation of the pain.  Did not take anything today for  Recent heavy lifting          Constitution: Negative.   HENT: Negative.     Neck: neck negative.   Cardiovascular: Negative.    Eyes: Negative.    Respiratory: Negative.     Gastrointestinal: Negative.    Genitourinary: Negative.    Musculoskeletal:  Positive for back pain.   Skin: Negative.    Allergic/Immunologic: Negative.    Neurological: Negative.    Hematologic/Lymphatic: Negative.       Objective:     Physical Exam   Constitutional: He is oriented to person, place, and time.  Non-toxic appearance. He does not appear ill. No distress.   HENT:   Head: Normocephalic and atraumatic.   Eyes: Conjunctivae are normal.   Pulmonary/Chest: Effort normal. No respiratory distress.   Abdominal: Normal appearance.   Musculoskeletal:         General: Tenderness (tenderness to palpation and hypertonicity of the right mid back area) present.   Neurological: He is oriented to person, place, and time.   Skin: Skin is not diaphoretic and no rash.   Psychiatric: His behavior is normal. Mood, judgment and thought content normal.   Vitals reviewed.         Previous History      Review of patient's allergies indicates:  No Known Allergies    Past Medical History:   Diagnosis Date    Anxiety 6/19/2013    Panic disorder    HLD (hyperlipidemia) 3/2/2015     Current Outpatient Medications   Medication Instructions    " "clomiPHENE (CLOMID) 50 mg, Daily    cyclobenzaprine (FLEXERIL) 10 mg, Oral, 3 times daily PRN    meloxicam (MOBIC) 15 mg, Oral, Daily    predniSONE (DELTASONE) 40 mg, Oral, Daily    rosuvastatin (CRESTOR) 20 MG tablet 1 tablet, Daily    VIIBRYD 20 mg Tab No dose, route, or frequency recorded.     Past Surgical History:   Procedure Laterality Date    TONSILLECTOMY AND ADENOIDECTOMY       Family History   Problem Relation Name Age of Onset    Depression Mother      Hyperlipidemia Father      Heart disease Father  55        MI    Depression Father      No Known Problems Sister      No Known Problems Brother      Depression Maternal Grandmother          possible bipolar    Heart disease Paternal Grandmother  54        MI    Heart disease Paternal Grandfather          cabg       Social History     Tobacco Use    Smoking status: Never     Passive exposure: Never    Smokeless tobacco: Never   Substance Use Topics    Alcohol use: Yes     Alcohol/week: 12.0 standard drinks of alcohol     Types: 12 Cans of beer per week        Physical Exam      Vital Signs Reviewed   /83   Pulse 77   Temp 98.6 °F (37 °C)   Resp 18   Ht 5' 7" (1.702 m)   Wt 91.6 kg (202 lb)   SpO2 98%   BMI 31.64 kg/m²        Procedures    Procedures     Labs     Results for orders placed or performed in visit on 12/28/23   POCT Strep A, Molecular    Collection Time: 12/28/23  8:25 AM   Result Value Ref Range    Molecular Strep A, POC Negative Negative     Acceptable Yes    POCT Influenza A/B Molecular    Collection Time: 12/28/23  8:33 AM   Result Value Ref Range    POC Molecular Influenza A Ag Negative Negative, Not Reported    POC Molecular Influenza B Ag Negative Negative, Not Reported     Acceptable Yes        Assessment:     1. Thoracic myofascial strain, initial encounter        Plan:   Medications sent to pharmacy  Start oral steroids tomorrow morning  Start an anti-inflammatory tomorrow morning such as " Alleve, ibuprofen or your meloxicam  Muscle relaxer may cause drowsiness.  Best to take at bedtime only.  Do not drink alcohol or drive when taking it.  No heavy lifting or strenuous activities until symptoms have resolved  Contact this clinic with any concerns    Thoracic myofascial strain, initial encounter    Other orders  -     dexAMETHasone injection 10 mg  -     ketorolac injection 30 mg  -     predniSONE (DELTASONE) 20 MG tablet; Take 2 tablets (40 mg total) by mouth once daily. for 5 days  Dispense: 10 tablet; Refill: 0  -     cyclobenzaprine (FLEXERIL) 10 MG tablet; Take 1 tablet (10 mg total) by mouth 3 (three) times daily as needed for Muscle spasms.  Dispense: 21 tablet; Refill: 0